# Patient Record
Sex: FEMALE | Race: WHITE | Employment: UNEMPLOYED | URBAN - METROPOLITAN AREA
[De-identification: names, ages, dates, MRNs, and addresses within clinical notes are randomized per-mention and may not be internally consistent; named-entity substitution may affect disease eponyms.]

---

## 2019-09-17 ENCOUNTER — HOSPITAL ENCOUNTER (OUTPATIENT)
Dept: SURGERY | Facility: HOSPITAL | Age: 69
Setting detail: HOSPITAL OUTPATIENT SURGERY
Discharge: HOME | End: 2019-09-17
Attending: OPHTHALMOLOGY

## 2019-10-01 ENCOUNTER — HOSPITAL ENCOUNTER (OUTPATIENT)
Dept: SURGERY | Facility: HOSPITAL | Age: 69
Setting detail: HOSPITAL OUTPATIENT SURGERY
Discharge: HOME | End: 2019-10-01
Attending: OPHTHALMOLOGY

## 2020-05-27 ENCOUNTER — OFFICE VISIT (OUTPATIENT)
Dept: GASTROENTEROLOGY | Facility: CLINIC | Age: 70
End: 2020-05-27

## 2020-05-27 VITALS — HEIGHT: 67 IN | TEMPERATURE: 98.1 F | WEIGHT: 150 LBS | BODY MASS INDEX: 23.54 KG/M2

## 2020-05-27 DIAGNOSIS — Z86.19 HISTORY OF HELICOBACTER PYLORI INFECTION: ICD-10-CM

## 2020-05-27 DIAGNOSIS — R10.84 GENERALIZED ABDOMINAL PAIN: Primary | ICD-10-CM

## 2020-05-27 DIAGNOSIS — R14.0 ABDOMINAL BLOATING: ICD-10-CM

## 2020-05-27 DIAGNOSIS — Z80.0 FAMILY HISTORY OF COLON CANCER: ICD-10-CM

## 2020-05-27 DIAGNOSIS — R14.3 INTESTINAL GAS EXCRETION: ICD-10-CM

## 2020-05-27 PROCEDURE — 99204 OFFICE O/P NEW MOD 45 MIN: CPT | Performed by: INTERNAL MEDICINE

## 2020-05-27 NOTE — PROGRESS NOTES
Chief Complaint   Patient presents with   • Abdominal Pain   • Diarrhea   • Bloated       Subjective     HPI    Mae Alexis is a 70 y.o. female with a past medical history noted below who presents for evaluation of abdominal pain, bloating, and diarrhea.  Symptoms present for over a year.  Timing is intermittent.  Awakes most morning with abdominal bloating and distension.  This leads to generalized abdominal pain and discomfort.  Feels better when she passes a lot of gas.  She is not taking any anti-gas medications.  She has tried Carafate and pantoprazole but really has not noticed much of an improvement with either of those medications.  She has since been off of them for about 2 weeks and has not noticed any worsened reflux symptoms.    She reports that diarrhea is very intermittent.  She reports that she was constipated up until the point that she had a cholecystectomy over 30 years ago and at that point she has tended towards looser stools.  She notices that when she eats fatty food she tends to have the episodes of diarrhea.  It is not severe.  She is not taking anything for it.    Records from [facility] and [facility] scanned in and personally reviewed.  She underwent an EGD in May 2019.  She reports that she was diagnosed with H. pylori at that time-- the preliminary pathology report that is been scanned and showed active gastritis.  She did have a test of cure in November 2019 via breath testing.  She reports she had a colonoscopy in May of last year as well that was normal.  She does have a family history of colon cancer in a brother.  She also had a normal upper GI series in May of last year.    April 21, 2020 labs show that she has a normal CMP, A1c of 6.    Hx of gisell, appy, partial hysterectomy    No smoking.  No excess ETOH.    Retired from customer service.    Brother with hx of CRC, diagnosed in his 50s.  She has also had uncles with CRC.  Her last colonoscopy was a year  ago.        Today's visit was in the office.  Both the patient and I were wearing face masks and proper hand hygiene was performed before and after the physical exam.       Past Medical History:   Diagnosis Date   • Enlarged heart    • Osteoporosis 2014   • Sleep apnea          Current Outpatient Medications:   •  denosumab (Prolia) 60 MG/ML solution prefilled syringe syringe, Inject 1 mL as directed Every 6 (Six) Months., Disp: , Rfl:   •  Misc Natural Products (OSTEO BI-FLEX JOINT SHIELD PO), Take 2 capsules by mouth Daily., Disp: , Rfl:   •  Multiple Vitamins-Minerals (ICAPS AREDS 2 PO), ICaps AREDS  2 capsules daily, Disp: , Rfl:     Allergies   Allergen Reactions   • Codeine Anxiety and Nausea And Vomiting   • Latex Itching and Rash   • Sulfa Antibiotics Rash       Social History     Socioeconomic History   • Marital status:      Spouse name: Not on file   • Number of children: Not on file   • Years of education: Not on file   • Highest education level: Not on file   Tobacco Use   • Smoking status: Never Smoker   • Smokeless tobacco: Never Used   Substance and Sexual Activity   • Alcohol use: Never     Frequency: Never   • Drug use: Never       Family History   Problem Relation Age of Onset   • Cervical cancer Mother    • Throat cancer Father    • Prostate cancer Father    • Colon cancer Brother    • Liver disease Paternal Grandfather        Review of Systems   Constitutional: Negative for activity change, appetite change and fatigue.   HENT: Negative for sore throat and trouble swallowing.    Respiratory: Negative.    Cardiovascular: Negative.    Gastrointestinal: Positive for abdominal distention, abdominal pain and diarrhea. Negative for blood in stool.   Endocrine: Negative for cold intolerance and heat intolerance.   Genitourinary: Negative for difficulty urinating, dysuria and frequency.   Musculoskeletal: Negative for arthralgias, back pain and myalgias.   Skin: Negative.    Hematological:  Negative for adenopathy. Does not bruise/bleed easily.   All other systems reviewed and are negative.      Objective     Vitals:    05/27/20 1428   Temp: 98.1 °F (36.7 °C)         05/27/20  1428   Weight: 68 kg (150 lb)     Body mass index is 23.49 kg/m².    Physical Exam   Constitutional: She is oriented to person, place, and time. She appears well-developed and well-nourished. No distress.   HENT:   Head: Normocephalic and atraumatic.   Right Ear: External ear normal.   Left Ear: External ear normal.   Nose: Nose normal.   Mouth/Throat: Oropharynx is clear and moist.   Eyes: Conjunctivae and EOM are normal. Right eye exhibits no discharge. Left eye exhibits no discharge. No scleral icterus.   Neck: Normal range of motion. Neck supple. No thyromegaly present.   No supraclavicular adenopathy   Cardiovascular: Normal rate, regular rhythm, normal heart sounds and intact distal pulses. Exam reveals no gallop.   No murmur heard.  No lower extremity edema   Pulmonary/Chest: Effort normal and breath sounds normal. No respiratory distress. She has no wheezes.   Abdominal: Soft. Normal appearance and bowel sounds are normal. She exhibits no distension and no mass. There is no hepatosplenomegaly. There is no tenderness. There is no rigidity, no rebound and no guarding. No hernia.   Genitourinary:   Genitourinary Comments: Rectal exam deferred   Musculoskeletal: Normal range of motion. She exhibits no edema or tenderness.   No atrophy of upper or lower extremities.  Normal digits and nails of both hands.   Lymphadenopathy:     She has no cervical adenopathy.   Neurological: She is alert and oriented to person, place, and time. She displays no atrophy. Coordination normal.   Skin: Skin is warm and dry. No rash noted. She is not diaphoretic. No erythema.   Psychiatric: She has a normal mood and affect. Her behavior is normal. Judgment and thought content normal.   Vitals reviewed.      WBC   Date Value Ref Range Status    08/28/2018 9.02 4.5 - 11.0 10*3/uL Final     RBC   Date Value Ref Range Status   08/28/2018 4.07 4.0 - 5.2 10*6/uL Final     Hemoglobin   Date Value Ref Range Status   08/28/2018 11.8 (L) 12.0 - 16.0 g/dL Final     Hematocrit   Date Value Ref Range Status   08/28/2018 35.7 (L) 36.0 - 46.0 % Final     MCV   Date Value Ref Range Status   08/28/2018 87.7 80.0 - 100.0 fL Final     MCH   Date Value Ref Range Status   08/28/2018 29.0 26.0 - 34.0 pg Final     MCHC   Date Value Ref Range Status   08/28/2018 33.1 31.0 - 37.0 g/dL Final     RDW   Date Value Ref Range Status   08/28/2018 12.6 12.0 - 16.8 % Final     MPV   Date Value Ref Range Status   08/28/2018 9.8 6.7 - 10.8 fL Final     Platelets   Date Value Ref Range Status   08/28/2018 166 140 - 440 10*3/uL Final     Neutrophil Rel %   Date Value Ref Range Status   08/28/2018 83.4 (H) 45 - 80 % Final     Lymphocyte Rel %   Date Value Ref Range Status   08/28/2018 8.0 (L) 15 - 50 % Final     Monocyte Rel %   Date Value Ref Range Status   08/28/2018 8.3 0 - 15 % Final     Eosinophil %   Date Value Ref Range Status   08/28/2018 0.0 0 - 7 % Final     Basophil Rel %   Date Value Ref Range Status   08/28/2018 0.0 0 - 2 % Final     Immature Grans %   Date Value Ref Range Status   08/28/2018 0.3 (H) 0 % Final     Neutrophils Absolute   Date Value Ref Range Status   08/28/2018 7.52 2.0 - 8.8 10*3/uL Final     Lymphocytes Absolute   Date Value Ref Range Status   08/28/2018 0.72 0.7 - 5.5 10*3/uL Final     Monocytes Absolute   Date Value Ref Range Status   08/28/2018 0.75 0.0 - 1.7 10*3/uL Final     Eosinophils Absolute   Date Value Ref Range Status   08/28/2018 0.00 0.0 - 0.8 10*3/uL Final     Basophils Absolute   Date Value Ref Range Status   08/28/2018 0.00 0.0 - 0.2 10*3/uL Final     Immature Grans, Absolute   Date Value Ref Range Status   08/28/2018 0.03 <1 10*3/uL Final     nRBC   Date Value Ref Range Status   08/28/2018 0 0 /100(WBC) Final       Sodium   Date Value Ref  Range Status   08/27/2018 141 137 - 145 mmol/L Final     Potassium   Date Value Ref Range Status   08/27/2018 4.1 3.5 - 5.1 mmol/L Final     Total CO2   Date Value Ref Range Status   08/27/2018 27 22 - 30 mmol/L Final     Chloride   Date Value Ref Range Status   08/27/2018 106 98 - 107 mmol/L Final     Creatinine   Date Value Ref Range Status   08/27/2018 0.6 (L) 0.7 - 1.5 mg/dL Final     BUN   Date Value Ref Range Status   08/27/2018 14 7 - 20 mg/dL Final     BUN/Creatinine Ratio   Date Value Ref Range Status   08/27/2018 23.3 RATIO Final     Calcium   Date Value Ref Range Status   08/27/2018 8.7 8.4 - 10.2 mg/dL Final     Alkaline Phosphatase   Date Value Ref Range Status   08/27/2018 72 38 - 126 U/L Final     Total Protein   Date Value Ref Range Status   08/27/2018 7.3 6.3 - 8.2 g/dL Final     ALT (SGPT)   Date Value Ref Range Status   08/27/2018 34 13 - 69 U/L Final     AST (SGOT)   Date Value Ref Range Status   08/27/2018 33 15 - 46 U/L Final     Total Bilirubin   Date Value Ref Range Status   08/27/2018 1.3 0.2 - 1.3 mg/dL Final     Albumin   Date Value Ref Range Status   08/27/2018 4.0 3.5 - 5.0 g/dL Final     Globulin   Date Value Ref Range Status   08/27/2018 3.3 1.5 - 4.5 g/dL Final     A/G Ratio   Date Value Ref Range Status   08/27/2018 1.2 1.1 - 2.5 RATIO Final         Imaging Results (Last 7 Days)     ** No results found for the last 168 hours. **            No notes on file    Assessment/Plan    1.  Generalized abdominal pain: I suspect this is secondary to the bloating that she is having.  She has been treated for H. pylori and has had no improvement in her symptoms with PPIs, Carafate    2.  Abdominal bloating: Suspected increased colonic gas given that she feels better when she passes a lot of gas    3.  History of H. pylori infection: He did not test of cure by November 2019    4.  Family history of colon cancer screening: She is up-to-date on surveillance    Plan  Celiac serology today  Gas-X  with all meals  FODMAPs handout and advised to review [facility]'s information regarding this diet  Samples of IBgard given for her to use as needed for bloating, pain  If no improvement, consider SIBO testing    Mae was seen today for abdominal pain, diarrhea and bloated.    Diagnoses and all orders for this visit:    Generalized abdominal pain  -     IgA  -     Tissue Transglutaminase, IgG  -     Tissue Transglutaminase, IgA    Abdominal bloating  -     IgA  -     Tissue Transglutaminase, IgG  -     Tissue Transglutaminase, IgA    History of Helicobacter pylori infection    Family history of colon cancer    Intestinal gas excretion        I have discussed the above plan with the patient.  They verbalize understanding and are in agreement with the plan.  They have been advised to contact the office for any questions, concerns, or changes related to their health.    Dictated utilizing Dragon dictation

## 2020-05-28 LAB
IGA SERPL-MCNC: 106 MG/DL (ref 64–422)
TTG IGA SER-ACNC: <2 U/ML (ref 0–3)
TTG IGG SER-ACNC: 3 U/ML (ref 0–5)

## 2020-06-01 ENCOUNTER — TELEPHONE (OUTPATIENT)
Dept: GASTROENTEROLOGY | Facility: CLINIC | Age: 70
End: 2020-06-01

## 2020-06-01 NOTE — TELEPHONE ENCOUNTER
Attempt call to home # - not in service.     Call to [facility] and [facility] - same #.     Call to PCP office - phone # [phone].  Updated in epic.    Call to pt.  Advise per Dr Flower note.  Verb understanding.

## 2020-06-01 NOTE — TELEPHONE ENCOUNTER
----- Message from Ester Flower MD sent at 6/1/2020  9:27 AM EDT -----  Her celiac testing was negative.    She should continue the Gas-X, IBgard and FODMAPs diet for the next few weeks to see how this works for her.

## 2020-09-01 ENCOUNTER — TELEPHONE (OUTPATIENT)
Dept: GASTROENTEROLOGY | Facility: CLINIC | Age: 70
End: 2020-09-01

## 2020-09-01 NOTE — TELEPHONE ENCOUNTER
----- Message from Adina Braun sent at 9/1/2020  8:30 AM EDT -----  Contact: [phone]  Patient wants to schedule an SIBO. Patient made first available appointment with Dr. Flower but that is not until Nov 05.

## 2020-09-01 NOTE — TELEPHONE ENCOUNTER
Called pt and pt reports that she has tried the fodmap diet and she tried the charcoal but it seems that things worsened. She feels like she may the sibo testing. She states she has seen her family MD and he saw where Dr Flower had suggested this.   Advised pt will send message to Dr Flower. Pt verb understanding.

## 2020-09-01 NOTE — TELEPHONE ENCOUNTER
Sibo kit ordered thru [facility] online. Kit to be mailed to pt. Called pt and on vm advised that we have ordered her sibo kit and it will be mailed to her. Advised her to call with questions.

## 2020-09-18 ENCOUNTER — TELEPHONE (OUTPATIENT)
Dept: GASTROENTEROLOGY | Facility: CLINIC | Age: 70
End: 2020-09-18